# Patient Record
Sex: MALE | Race: BLACK OR AFRICAN AMERICAN | NOT HISPANIC OR LATINO | Employment: FULL TIME
[De-identification: names, ages, dates, MRNs, and addresses within clinical notes are randomized per-mention and may not be internally consistent; named-entity substitution may affect disease eponyms.]

---
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
## 2022-03-22 ENCOUNTER — APPOINTMENT (OUTPATIENT)
Dept: GENERAL RADIOLOGY | Facility: CLINIC | Age: 22
End: 2022-03-22
Attending: EMERGENCY MEDICINE
Payer: COMMERCIAL

## 2022-03-22 ENCOUNTER — HOSPITAL ENCOUNTER (EMERGENCY)
Facility: CLINIC | Age: 22
Discharge: HOME | End: 2022-03-23
Attending: EMERGENCY MEDICINE | Admitting: EMERGENCY MEDICINE
Payer: COMMERCIAL

## 2022-03-22 VITALS
RESPIRATION RATE: 16 BRPM | OXYGEN SATURATION: 100 % | TEMPERATURE: 97.6 F | HEART RATE: 100 BPM | DIASTOLIC BLOOD PRESSURE: 83 MMHG | SYSTOLIC BLOOD PRESSURE: 134 MMHG

## 2022-03-22 DIAGNOSIS — S93.491A SPRAIN OF ANTERIOR TALOFIBULAR LIGAMENT OF RIGHT ANKLE, INITIAL ENCOUNTER: ICD-10-CM

## 2022-03-22 PROCEDURE — 99284 EMERGENCY DEPT VISIT MOD MDM: CPT

## 2022-03-22 PROCEDURE — 73610 X-RAY EXAM OF ANKLE: CPT | Mod: RT

## 2022-03-22 ASSESSMENT — ENCOUNTER SYMPTOMS
JOINT SWELLING: 1
ARTHRALGIAS: 1

## 2022-03-22 NOTE — Clinical Note
Luigi Hutson was seen and treated in our emergency department on 3/22/2022.  He may return to work on 03/28/2022.       If you have any questions or concerns, please don't hesitate to call.      Josué Samaniego MD

## 2022-03-22 NOTE — Clinical Note
Haresh was seen and treated in our emergency department on 3/22/2022.  He may return to school on 03/28/2022.      If you have any questions or concerns, please don't hesitate to call.      Josué Samaniego MD

## 2022-03-23 NOTE — ED PROVIDER NOTES
History     Chief Complaint:  Ankle Pain     HPI   Luigi Hutson is a 21 year old male who presents with a sprained right ankle which was sustained while playing basketball. He inverted the ankle and has pain on the lateral aspect since.      Review of Systems   Musculoskeletal: Positive for arthralgias and joint swelling.   All other systems reviewed and are negative.    Allergies:  No known drug allergies     Medications:  Zyrtec    Past Medical History:     Autism     Social History:  Patient presents unaccomapnied     Physical Exam     Patient Vitals for the past 24 hrs:   BP Temp Temp src Pulse Resp SpO2   03/22/22 2256 134/83 97.6  F (36.4  C) Temporal 100 16 100 %     Physical Exam  Nursing note and vitals reviewed.  Constitutional: Cooperative.   Cardiovascular: Normal rate, regular rhythm. 2+ right DP pulse  Pulmonary/Chest: Effort normal .   Musculoskeletal: Normal range of motion of RLE. No tenderness to proximal tibia or 5th metatarsal. Swelling and tenderness to lateral malleolus.  Neurological: Alert. Strength and sensation in RLE normal.   Skin: Skin is warm and dry. No rash noted.   Psychiatric: Normal mood and affect.      Emergency Department Course   Imaging:  Ankle XR, G/E 3 views, right   Final Result   IMPRESSION:    1. No visualized acute fracture or malalignment of the right ankle.   2. Moderate soft tissue swelling over the lateral malleolus.         Report per radiology    Reviewed:  I reviewed nursing notes, vitals, past medical history and Care Everywhere    Assessments:  2334 I obtained history and examined the patient as noted above.     Disposition:  The patient was discharged to home.     Impression & Plan     Medical Decision Making:  Luigi Hutson is a 21 year old male who presents for evaluation after injuring the right ankle.  Signs and symptoms are consistent with an ankle sprain.  A broad differential was considered including sprain, strain, fracture,  tendon rupture, nerve impingement/compromise, referred pain. Supportive outpatient management is indicated.  Air case splint applied.  Rest, ice, and elevation treatment was discussed with the patient.     Diagnosis:    ICD-10-CM    1. Sprain of anterior talofibular ligament of right ankle, initial encounter  S93.491A        Scribe Disclosure:  I, Ross Welch, am serving as a scribe at 11:41 PM on 3/22/2022 to document services personally performed by Josué Samaniego MD based on my observations and the provider's statements to me.          Josué Samaniego MD  03/23/22 0140

## 2022-03-23 NOTE — PROGRESS NOTES
ASSESSMENT & PLAN    1. Sprain of anterior talofibular ligament of right ankle, initial encounter      Luigi Hutson is a 21 year old male presenting for evaluation of right inversion ankle sprain 1.5 weeks ago (DOI 3/22/22). History, exam and imaging findings were reviewed today, consistent with ATFL sprain. Swelling has improved and his pain is centered over ATFL with minimal laxity. Exam is reassuring against significant tear, multi-ligament injury or high ankle sprain. Reviewed plan inclusive of pain and swelling control (nsaids, ice, compression, elevation), modified activity (weight bearing as tolerated based on pain in the brace) and the importance of physical therapy to regain strength and balance. Has transitioned off crutches.    Upon discussion, plan to proceed with the following:  - Transition from air cast to tri-zoltan brace today.  - Weight bearing (walking) as tolerated in the brace based on pain.    - Referral placed to start physical therapy. When tolerated, your physical therapst will begin resistance exercises and proprioceptive drills, then help you advance back to playing sports.  - Encouraged to wear the ankle brace and the first 6 months after an ankle sprain, as this is the most vulnerable time for re-injury.      Please schedule a follow up appointment to see me in 4-6 weeks, or sooner as needed for persistence or worsening of pain. You may call our direct clinic number ([phone]) at any time with questions or concerns.    Patricia Mcwilliams MD, [facility]      -----    SUBJECTIVE  Luigi Hutson is a/an 21 year old male who is seen as an ER referral for evaluation of right ankle pain. The patient is seen by themselves.    Onset:3/22/22  9 day(s) ago. Patient describes injury as inverted ankle injury when landing awkwardly form a jump while playing basketball. He did not feel a pop or snap. He was able to hobble off the  jose with the assistance of a friend. He presented to the ED where X-rays were negative for fracture and exam was most consistent with ATFL sprain. He was placed in an aircast with crutches. He was able to transition off of the crutches over 1 week. Noting significant improvement in pain and swelling.   Location of Pain: right lateral ankle.   Rating of Pain at worst: 10/10  Rating of Pain Currently: 2/10  Worsened by: inversion   Better with: use of brace   Treatments tried: rest/activity avoidance, elevation, ice and casting/splinting/bracing  Associated symptoms: swelling. No ecchymosis, skin breakdown, numbness, tingling. No radiation of pain, instability or mechanical symptoms.    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, works at [employer]; enjoys recreational sports    Past Medical History:   Diagnosis Date     Eczema      Social History     Socioeconomic History     Marital status: Single     Spouse name: Not on file     Number of children: Not on file     Years of education: Not on file     Highest education level: Not on file   Occupational History     Not on file   Tobacco Use     Smoking status: Never Smoker     Smokeless tobacco: Not on file   Substance and Sexual Activity     Alcohol use: No     Drug use: No     Sexual activity: Never   Other Topics Concern     Not on file   Social History Narrative     Not on file     Social Determinants of Health     Financial Resource Strain: Not on file   Food Insecurity: Not on file   Transportation Needs: Not on file   Physical Activity: Not on file   Stress: Not on file   Social Connections: Not on file   Intimate Partner Violence: Not on file   Housing Stability: Not on file          Patient's past medical, surgical, social, and family histories were reviewed today and no changes are noted.    REVIEW OF SYSTEMS:  10 point ROS is negative other than symptoms noted above in HPI, Past  "Medical History or as stated below  Constitutional: NEGATIVE for fever, chills, change in weight  Skin: NEGATIVE for worrisome rashes, moles or lesions  GI/: NEGATIVE for bowel or bladder changes  Neuro: NEGATIVE for weakness, dizziness or paresthesias    OBJECTIVE:  /72   Ht 1.905 m (6' 3\")   Wt 104.3 kg (230 lb)   BMI 28.75 kg/m     General: healthy, alert and in no distress  HEENT: no scleral icterus or conjunctival erythema  Skin: no suspicious lesions or rash. No jaundice.  CV:  no pedal edema  Resp: normal respiratory effort without conversational dyspnea   Psych: normal mood and affect  Gait: normal steady gait with appropriate coordination and balance  Neuro: Normal light sensory exam of lower extremity  MSK:  RIGHT ANKLE  Inspection:    Moderate lateral ankle swelling. No bruising or ecchymosis is observed  Palpation:    Tender about the ATFL. Remainder of bony and ligamentous landmarks are nontender.  Range of Motion:     Plantarflexion limited slightly by pain / dorsiflexion limited slightly by pain / inversion within normal limits / eversion within normal limits  Strength:    Plantarflexion full / dorsiflexion full / inversion full / eversion full  Special Tests:    positive anterior drawer    negative squeeze test eversion stress test, talar tilt      Independent review of the below imaging:  RIGHT ANKLE 3 VIEWS  LOCATION: [facility]  DATE/TIME: 3/22/2022 11:06 PM     INDICATION: Injury. Swelling.  COMPARISON: None.                                                                      IMPRESSION:   1. No visualized acute fracture or malalignment of the right ankle.  2. Moderate soft tissue swelling over the lateral malleolus.      Patricia Mcwilliams MD, [facility]    "

## 2022-03-23 NOTE — ED TRIAGE NOTES
Pt here with c/o R ankle pain and swelling after landing on his ankle wrong while playing basketball this evening. CMS intact. Weight bearing as tolerated. ABC intact.

## 2022-04-01 ENCOUNTER — OFFICE VISIT (OUTPATIENT)
Dept: ORTHOPEDICS | Facility: CLINIC | Age: 22
End: 2022-04-01
Payer: COMMERCIAL

## 2022-04-01 VITALS
DIASTOLIC BLOOD PRESSURE: 72 MMHG | WEIGHT: 230 LBS | HEIGHT: 75 IN | SYSTOLIC BLOOD PRESSURE: 138 MMHG | BODY MASS INDEX: 28.6 KG/M2

## 2022-04-01 DIAGNOSIS — S93.491A SPRAIN OF ANTERIOR TALOFIBULAR LIGAMENT OF RIGHT ANKLE, INITIAL ENCOUNTER: Primary | ICD-10-CM

## 2022-04-01 PROCEDURE — 99204 OFFICE O/P NEW MOD 45 MIN: CPT | Performed by: STUDENT IN AN ORGANIZED HEALTH CARE EDUCATION/TRAINING PROGRAM

## 2022-04-01 NOTE — LETTER
4/1/2022         RE: Luigi Hutson  [address]  [place] [address]        Dear Colleague,    Thank you for referring your patient, Luigi Hutson, to the [facility] [place]. Please see a copy of my visit note below.    ASSESSMENT & PLAN    1. Sprain of anterior talofibular ligament of right ankle, initial encounter      Luigi Hutson is a 21 year old male presenting for evaluation of right inversion ankle sprain 1.5 weeks ago (DOI 3/22/22). History, exam and imaging findings were reviewed today, consistent with ATFL sprain. Swelling has improved and his pain is centered over ATFL with minimal laxity. Exam is reassuring against significant tear, multi-ligament injury or high ankle sprain. Reviewed plan inclusive of pain and swelling control (nsaids, ice, compression, elevation), modified activity (weight bearing as tolerated based on pain in the brace) and the importance of physical therapy to regain strength and balance. Has transitioned off crutches.    Upon discussion, plan to proceed with the following:  - Transition from air cast to tri-zoltan brace today.  - Weight bearing (walking) as tolerated in the brace based on pain.    - Referral placed to start physical therapy. When tolerated, your physical therapst will begin resistance exercises and proprioceptive drills, then help you advance back to playing sports.  - Encouraged to wear the ankle brace and the first 6 months after an ankle sprain, as this is the most vulnerable time for re-injury.      Please schedule a follow up appointment to see me in 4-6 weeks, or sooner as needed for persistence or worsening of pain. You may call our direct clinic number ([phone]) at any time with questions or concerns.    Patricia Mcwilliams MD, [facility]      -----    SUBJECTIVE  Luigi Hutson is a/an 21 year old male who is seen as an ER  referral for evaluation of right ankle pain. The patient is seen by themselves.    Onset:3/22/22  9 day(s) ago. Patient describes injury as inverted ankle injury when landing awkwardly form a jump while playing basketball. He did not feel a pop or snap. He was able to hobble off the court with the assistance of a friend. He presented to the ED where X-rays were negative for fracture and exam was most consistent with ATFL sprain. He was placed in an aircast with crutches. He was able to transition off of the crutches over 1 week. Noting significant improvement in pain and swelling.   Location of Pain: right lateral ankle.   Rating of Pain at worst: 10/10  Rating of Pain Currently: 2/10  Worsened by: inversion   Better with: use of brace   Treatments tried: rest/activity avoidance, elevation, ice and casting/splinting/bracing  Associated symptoms: swelling. No ecchymosis, skin breakdown, numbness, tingling. No radiation of pain, instability or mechanical symptoms.    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, works at [employer]; enjoys recreational sports    Past Medical History:   Diagnosis Date     Eczema      Social History     Socioeconomic History     Marital status: Single     Spouse name: Not on file     Number of children: Not on file     Years of education: Not on file     Highest education level: Not on file   Occupational History     Not on file   Tobacco Use     Smoking status: Never Smoker     Smokeless tobacco: Not on file   Substance and Sexual Activity     Alcohol use: No     Drug use: No     Sexual activity: Never   Other Topics Concern     Not on file   Social History Narrative     Not on file     Social Determinants of Health     Financial Resource Strain: Not on file   Food Insecurity: Not on file   Transportation Needs: Not on file   Physical Activity: Not on file   Stress: Not on file   Social Connections: Not on file  "  Intimate Partner Violence: Not on file   Housing Stability: Not on file          Patient's past medical, surgical, social, and family histories were reviewed today and no changes are noted.    REVIEW OF SYSTEMS:  10 point ROS is negative other than symptoms noted above in HPI, Past Medical History or as stated below  Constitutional: NEGATIVE for fever, chills, change in weight  Skin: NEGATIVE for worrisome rashes, moles or lesions  GI/: NEGATIVE for bowel or bladder changes  Neuro: NEGATIVE for weakness, dizziness or paresthesias    OBJECTIVE:  /72   Ht 1.905 m (6' 3\")   Wt 104.3 kg (230 lb)   BMI 28.75 kg/m     General: healthy, alert and in no distress  HEENT: no scleral icterus or conjunctival erythema  Skin: no suspicious lesions or rash. No jaundice.  CV:  no pedal edema  Resp: normal respiratory effort without conversational dyspnea   Psych: normal mood and affect  Gait: normal steady gait with appropriate coordination and balance  Neuro: Normal light sensory exam of lower extremity  MSK:  RIGHT ANKLE  Inspection:    Moderate lateral ankle swelling. No bruising or ecchymosis is observed  Palpation:    Tender about the ATFL. Remainder of bony and ligamentous landmarks are nontender.  Range of Motion:     Plantarflexion limited slightly by pain / dorsiflexion limited slightly by pain / inversion within normal limits / eversion within normal limits  Strength:    Plantarflexion full / dorsiflexion full / inversion full / eversion full  Special Tests:    positive anterior drawer    negative squeeze test eversion stress test, talar tilt      Independent review of the below imaging:  RIGHT ANKLE 3 VIEWS  LOCATION: [facility]  DATE/TIME: 3/22/2022 11:06 PM     INDICATION: Injury. Swelling.  COMPARISON: None.                                                                      IMPRESSION:   1. No visualized acute fracture or malalignment of the right ankle.  2. Moderate soft tissue " swelling over the lateral malleolus.      Patricia Mcwilliams MD, [facility]        Again, thank you for allowing me to participate in the care of your patient.        Sincerely,        Patricia Mcwilliams MD

## 2022-04-01 NOTE — PATIENT INSTRUCTIONS
1. Sprain of anterior talofibular ligament of right ankle, initial encounter      Luigi Hutson is a 21 year old male presenting for evaluation of right inversion ankle sprain 1.5 weeks ago (DOI 3/22/22). History, exam and imaging findings were reviewed today, consistent with ATFL sprain. Swelling has improved and his pain is centered over ATFL and AITFL. Reviewed plan inclusive of pain and swelling control (nsaids, ice, compression, elevation), modified activity (weight bearing as tolerated based on pain in the brace) and the importance of physical therapy to regain strength and balance. Has transitioned off crutches.    Upon discussion, plan to proceed with the following:  - Transition from air cast to tri-zoltan brace today.  - Weight bearing (walking) as tolerated in the brace based on pain.    - Referral placed to start physical therapy. When tolerated, your physical therapst will begin resistance exercises and proprioceptive drills, then help you advance back to playing sports.  - Encouraged to wear the ankle brace and the first 6 months after an ankle sprain, as this is the most vulnerable time for re-injury.      Please schedule a follow up appointment to see me in 4 weeks, or sooner as needed for persistence or worsening of pain. You may call our direct clinic number ([phone]) at any time with questions or concerns.    Patricia Mcwilliams MD, [facility]    WHAT IS AN ANKLE SPRAIN:  Symptoms include pain, bruising, and swelling around ankle, often on outer side. The pain may be sharp with activity and dull at rest. You may be walking with a limp at first. The swelling is often present after the pain resolves. If your pain is severe, not improving over 5-7 days, or shoots up your leg, let your physician know as you may need crutches and an immobilizer.    Treatment:  -Ice 10-15 minutes several times a day for the first few days and then after  activity.  -Walk as tolerated. Restrict other activities until pain allows. Biking, pool running or swimming are good alternative activities.  -You may benefit from an ankle brace for support while strengthening with therapy  -Some require immobilzation and crutches initially    Strengthening therapy  -Ice 10-15 minutes after activity. (or Ice bath 5-7min)  -often hip and ankle weakness leads to lower extremity (foot, ankle, shin, and knee) problems so a lot of focus will be on core strength and balance  - recommend yoga for core strengthening and stretching  -Perform exercises as instructed through handout or formal therapy if doing. Until then start with the following:  -ankle strengthening (additional below)   1) balance on one foot 1-2 min daily (perform on both feet)   2) arch raises- tighten bottom of foot like trying to shorten foot and hold x 3-5  sec, repeat 5 times   3) ankle exercises (4 way with theraband)- 3 sets of 10-15 (fatigue) daily   5) heel raises on step-- once painfree lowering on both, start to slowly lower on  one foot    Return to activity guidelines:  -If it hurts, do not do it!  -wear ankle brace until pain free with full activity and exercises for at least 6 weeks  -Must meet each goal before return to play:   1) painfree jogging straight line   2) pain free sprints   3) pain free cutting/ changing directions      Ankle Sprain Exercises    As soon as it doesn t hurt too much to put pressure on the ball of your foot, start stretching your ankle using the towel stretch. When this stretch is easy, try the other exercises.    Towel stretch: Sit on a hard surface with your injured leg stretched out in front of you. Loop a towel around your toes and the ball of your foot and pull the towel toward your body keeping your leg straight. Hold this position for 15 to 30 seconds and then relax. Repeat 3 times.    Standing calf stretch: Stand facing a wall with your hands on the wall at about eye  level. Keep your injured leg back with your heel on the floor. Keep the other leg forward with the knee bent. Turn your back foot slightly inward (as if you were pigeon-toed). Slowly lean into the wall until you feel a stretch in the back of your calf. Hold the stretch for 15 to 30 seconds. Return to the starting position. Repeat 3 times. Do this exercise several times each day.    Standing soleus stretch: Stand facing a wall with your hands on the wall at about chest height. Keep your injured leg back with your heel on the floor. Keep the other leg forward with the knee bent. Turn your back foot slightly inward (as if you were pigeon-toed). Bend your back knee slightly and gently lean into the wall until you feel a stretch in the lower calf of your injured leg. Hold the stretch for 15 to 30 seconds. Return to the starting position. Repeat 3 times.    Ankle range of motion: Sit or lie down with your legs straight and your knees pointing toward the ceiling. Point your toes on your injured side toward your nose, then away from your body. Point your toes in toward your other foot and then out away from your other foot. Finally, move the top of your foot in circles. Move only your foot and ankle. Don't move your leg. Repeat 10 times in each direction. Push hard in all directions.  Resisted ankle dorsiflexion: Tie a knot in one end of the elastic tubing and shut the knot in a door. Tie a loop in the other end of the tubing and put the foot on your injured side through the loop so that the tubing goes around the top of the foot. Sit facing the door with your injured leg straight out in front of you. Move away from the door until there is tension in the tubing. Keeping your leg straight, pull the top of your foot toward your body, stretching the tubing. Slowly return to the starting position. Do 2 sets of 15.  Resisted ankle plantar flexion: Sit with your injured leg stretched out in front of you. Loop the tubing around  the ball of your foot. Hold the ends of the tubing with both hands. Gently press the ball of your foot down and point your toes, stretching the tubing. Return to the starting position. Do 2 sets of 15.    Resisted ankle inversion: Sit with your legs stretched out in front of you. Cross the ankle of your uninjured leg over your other ankle. Wrap elastic tubing around the ball of the foot of your injured leg and then loop it around your other foot so that the tubing is anchored there at one end. Hold the other end of the tubing in your hand. Turn the foot of your injured leg inward and upward. This will stretch the tubing. Return to the starting position. Do 2 sets of 15.    Resisted ankle eversion: Sit with both legs stretched out in front of you, with your feet about a shoulder's width apart. Tie a loop in one end of elastic tubing. Put the foot of your injured leg through the loop so that the tubing goes around the arch of that foot and wraps around the outside of the other foot. Hold onto the other end of the tubing with your hand to provide tension. Turn the foot of your injured leg up and out. Make sure you keep your other foot still so that it will allow the tubing to stretch as you move the foot of your injured leg. Return to the starting position. Do 2 sets of 15.  You may do the following exercises when you can stand on your injured ankle without pain.    Heel raise: Stand behind a chair or counter with both feet flat on the floor. Using the chair or counter as a support, rise up onto your toes and hold for 5 seconds. Then slowly lower yourself down without holding onto the support. (It's OK to keep holding onto the support if you need to.) When this exercise becomes less painful, try doing this exercise while you are standing on the injured leg only. Repeat 15 times. Do 2 sets of 15. Rest 30 seconds between sets.    Step-up: Stand with the foot of your injured leg on a support 3 to 5 inches (8 to 13  centimeters) high --like a small step or block of wood. Keep your other foot flat on the floor. Shift your weight onto the injured leg on the support. Straighten your injured leg as the other leg comes off the floor. Return to the starting position by bending your injured leg and slowly lowering your uninjured leg back to the floor. Do 2 sets of 15.    Balance and reach exercises: Stand next to a chair with your injured leg farther from the chair. The chair will provide support if you need it. Stand on the foot of your injured leg and bend your knee slightly. Try to raise the arch of this foot while keeping your big toe on the floor. Keep your foot in this position.    With the hand that is farther away from the chair, reach forward in front of you by bending at the waist. Avoid bending your knee any more as you do this. Repeat this 15 times. To make the exercise more challenging, reach farther in front of you. Do 2 sets of 15.    While keeping your arch raised, reach the hand that is farther away from the chair across your body toward the chair. The farther you reach, the more challenging the exercise. Do 2 sets of 15.    Side-lying leg lift: Lie on your uninjured side. Tighten the front thigh muscles on your injured leg and lift that leg 8 to 10 inches (20 to 25 centimeters) away from the other leg. Keep the leg straight and lower it slowly. Do 2 sets of 15.  If you have access to a wobble board, do the following exercises:    Wobble board exercises  Stand on a wobble board with your feet shoulder-width apart.    Rock the board forwards and backwards 30 times, then side to side 30 times. Hold on to a chair if you need support.  Rotate the wobble board around so that the edge of the board is in contact with the floor at all times. Do this 30 times in a clockwise and then a counterclockwise direction.  Balance on the wobble board for as long as you can without letting the edges touch the floor. Try to do this for 2  minutes without touching the floor.  Rotate the wobble board in clockwise and counterclockwise circles, but do not let the edge of the board touch the floor.  When you have mastered the wobble exercises standing on both legs, try repeating them while standing on just your injured leg. After you are able to do these exercises on one leg, try to do them with your eyes closed. Make sure you have something nearby to support you in case you lose your balance.    Developed by [employer].  Published by [employer].  Copyright  2014 [employer] and/or one of its subsidiaries. All rights reserved.

## 2022-04-08 ENCOUNTER — THERAPY VISIT (OUTPATIENT)
Dept: PHYSICAL THERAPY | Facility: CLINIC | Age: 22
End: 2022-04-08
Payer: COMMERCIAL

## 2022-04-08 DIAGNOSIS — S93.491A SPRAIN OF ANTERIOR TALOFIBULAR LIGAMENT OF RIGHT ANKLE, INITIAL ENCOUNTER: ICD-10-CM

## 2022-04-08 DIAGNOSIS — S93.491A SPRAIN OF ANTERIOR TALOFIBULAR LIGAMENT OF RIGHT ANKLE: ICD-10-CM

## 2022-04-08 PROCEDURE — 97161 PT EVAL LOW COMPLEX 20 MIN: CPT | Mod: GP | Performed by: PHYSICAL THERAPIST

## 2022-04-08 PROCEDURE — 97110 THERAPEUTIC EXERCISES: CPT | Mod: GP | Performed by: PHYSICAL THERAPIST

## 2022-04-08 NOTE — PROGRESS NOTES
[facility]    OUTPATIENT Physical Therapy ORTHOPEDIC EVALUATION  PLAN OF TREATMENT FOR OUTPATIENT REHABILITATION  (COMPLETE FOR INITIAL CLAIMS ONLY)  Patient's Last Name, First Name, M.I.  YOB: 2000  Luigi Hutson    Provider s Name:  [facility]   Medical Record No.  [id number]   Start of Care Date:  04/08/22   Onset Date:   03/22/22   Type:     _X__PT   ___OT Medical Diagnosis:    Encounter Diagnoses   Name Primary?     Sprain of anterior talofibular ligament of right ankle, initial encounter      Sprain of anterior talofibular ligament of right ankle         Treatment Diagnosis:  right ankle sprain        Goals:     04/08/22 0500   Body Part   Goals listed below are for Right ankle sprain   Goal #1   Goal #1 ambulation   Previous Functional Level No restrictions   Current Functional Level Minutes patient can walk;Unable to make sharp turns;with brace;Ambulates with gait deviation of    Performance Level 10 min amb with brace with limp on right   STG Target Performance Minutes patient will be able to walk   Performance Level 20 minutes no limp on right level surface, stairs 1/10 pain   Rationale for safe household ambulation;for safe outdoor household ambulation;for safe community ambulation;for safe work place ambulation;to maintain proper body mechanics/posture while ambulating to avoid additional compensatory injury due to improper gait mechanics;to promote a healthy and active lifestyle   Due Date 05/06/22    LTG Target Performance Minutes patient will be able to  walk;on uneven terrain; on sharp inclines/declines;Able to make sharp turns;Ambulate without gait deviation   Performance Level 30 minutes, level and uneven surface, stairs, no limp right 0/10 pain   Rationale for safe household ambulation;for safe outdoor household ambulation;for safe  community ambulation;for safe work place ambulation;to maintain proper body mechanics/posture while ambulating to avoid additional compensatory injury due to improper gait mechanics;to promote a healthy and active lifestyle       Therapy Frequency:  1x/week  Predicted Duration of Therapy Intervention:  8 weeks    May Carrion, PT                 I CERTIFY THE NEED FOR THESE SERVICES FURNISHED UNDER        THIS PLAN OF TREATMENT AND WHILE UNDER MY CARE     (Physician attestation of this document indicates review and certification of the therapy plan).                       Certification Date From:  04/08/22   Certification Date To:  06/03/22    Referring Provider:  Patricia Mcwilliams    Initial Assessment        See Epic Evaluation SOC Date: 04/08/22

## 2022-04-08 NOTE — PROGRESS NOTES
Physical Therapy Initial Evaluation  Subjective:  The history is provided by the patient. No  was used.   Patient Health History  Luigi Hutson being seen for right ankle sprain.     Problem began: 3/22/2022.   Problem occurred: sprained right ankle landing awkwardly playing basketball   Pain is reported as 1/10 on pain scale.  General health as reported by patient is good.  Pertinent medical history includes: none.     Medical allergies: none.   Surgeries include:  None.    Current medications:  None.    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   Primary job tasks include:  Prolonged sitting and prolonged standing.                  Therapist Generated HPI Evaluation  Problem details: Pt reports spraining right ankle- 3/22/22 when he landed awkwardly playing basketball. He did go to the ED, had x-rays (-), and was given an aircast to wear.He used crutches for about one week. He saw ortho physician 4/1/22, diagnosis of right inversion, ATFL sprain. and transitioned from aircast brace to trilock brace which he has been wearing since. He is WBAT, and per MD note encouraged to wear brace x 6 months with activity to reduce chance of reinjury.   He normally goes to [facility] for weightlifting and exercise, but has not gone since the injury..         Type of problem:  Right ankle.    This is a new condition.  Condition occurred with:  A wrong landing.  Where condition occurred: during recreation/sport.  Patient reports pain:  Lateral and anterior.  Pain is described as aching and sharp and is constant.  Pain is the same all the time.  Since onset symptoms are gradually improving.  Associated symptoms:  Loss of motion/stiffness and loss of strength. Symptoms are exacerbated by bending/squatting, walking, certain positions and activity  and relieved by bracing/immobilizing, ice and rest.  Special tests included:  X-ray.    Restrictions due to  condition include:  Working in normal job without restrictions.  Barriers include:  None as reported by patient.                        Objective:    Gait:  Turns right foot out in eversion/ hip ER on right due to decreased dorsiflexion, with slight limp on right.   Gait Type:  Antalgic   Weight Bearing Status:  WBAT   Assistive Devices:  None            Ankle/Foot Evaluation  ROM:    AROM:    Dorsiflexion: Left:    Right:   0  Plantarflexion: Left:     Right:  25  Inversion: Left:      Right:  18  Eversion:     Right:  15      PROM:    Dorsiflexion: Left:        Right:   2             Pain: end range pain with inversion and eversion          PALPATION:     Right ankle tenderness present at:   anterior tibialis; anterior talofibular ligament and lateral malleolus  EDEMA: Edema ankle: moderate edema around lateral malleolus and anterior region. no pitting.            FUNCTIONAL TESTS:       Core Strength:     Single Leg Bridge: Right: did not test as unable due to pain/20 reps     Quad:    Single Leg Squat Right: did not test   Bilateral Leg Squat: 50% of ROM. shifts weight to left                                                           General     ROS    Assessment/Plan:    Patient is a 21 year old male with right side ankle complaints.    Patient has the following significant findings with corresponding treatment plan.                Diagnosis 1:  Right ankle sprain  Pain -  hot/cold therapy, manual therapy and home program  Decreased ROM/flexibility - manual therapy, therapeutic exercise and home program  Decreased joint mobility - manual therapy, therapeutic exercise and home program  Decreased strength - therapeutic exercise, therapeutic activities and home program  Impaired balance - neuro re-education, gait training, therapeutic activities and home program  Impaired gait - gait training and home program    Therapy Evaluation Codes:   1) History comprised of:   Personal factors that impact the plan of care:       None.    Comorbidity factors that impact the plan of care are:      None.     Medications impacting care: None.  2) Examination of Body Systems comprised of:   Body structures and functions that impact the plan of care:      Ankle.   Activity limitations that impact the plan of care are:      Bending, Jumping, Running, Sports, Squatting/kneeling, Stairs, Standing and Walking.  3) Clinical presentation characteristics are:   Stable/Uncomplicated.  4) Decision-Making    Low complexity using standardized patient assessment instrument and/or measureable assessment of functional outcome.  Cumulative Therapy Evaluation is: Low complexity.    Previous and current functional limitations:  (See Goal Flow Sheet for this information)    Short term and Long term goals: (See Goal Flow Sheet for this information)     Communication ability:  Patient appears to be able to clearly communicate and understand verbal and written communication and follow directions correctly.  Treatment Explanation - The following has been discussed with the patient:   RX ordered/plan of care  Anticipated outcomes  Possible risks and side effects  This patient would benefit from PT intervention to resume normal activities.   Rehab potential is good.    Frequency:  1 X week, once daily  Duration:  for 4weeks tapering to 2X a month over 4 weeks  Discharge Plan:  Achieve all LTG.  Independent in home treatment program.  Reach maximal therapeutic benefit.    Please refer to the daily flowsheet for treatment today, total treatment time and time spent performing 1:1 timed codes.

## 2022-04-22 ENCOUNTER — THERAPY VISIT (OUTPATIENT)
Dept: PHYSICAL THERAPY | Facility: CLINIC | Age: 22
End: 2022-04-22
Payer: COMMERCIAL

## 2022-04-22 DIAGNOSIS — S93.491A SPRAIN OF ANTERIOR TALOFIBULAR LIGAMENT OF RIGHT ANKLE: Primary | ICD-10-CM

## 2022-04-22 PROCEDURE — 97110 THERAPEUTIC EXERCISES: CPT | Mod: GP | Performed by: PHYSICAL THERAPIST

## 2022-04-22 PROCEDURE — 97112 NEUROMUSCULAR REEDUCATION: CPT | Mod: GP | Performed by: PHYSICAL THERAPIST

## 2022-05-27 PROBLEM — S93.491A SPRAIN OF ANTERIOR TALOFIBULAR LIGAMENT OF RIGHT ANKLE: Status: RESOLVED | Noted: 2022-04-08 | Resolved: 2022-05-27

## 2022-05-27 NOTE — PROGRESS NOTES
Discharge Note    Luigi has not scheduled further follow up and current status is unknown.  Please see information below for last relevant information on current status.  Patient seen for 2 visits.    SUBJECTIVE  Subjective changes noted by patient:  Pt 15 minutes late. overall is better, not wearing brace as much for everyday activites, will still wear with prolonged walking. can walk for 20 minutes minimal to no pain.  .  Current pain level is 0/10.     Previous pain level was  1/10.   Changes in function:  Yes (See Goal flowsheet attached for changes in current functional level)  Adverse reaction to treatment or activity: None    OBJECTIVE  Changes noted in objective findings: right AROM DF 6; ambulate with good gait pattern, no limp on right.     ASSESSMENT/PLAN  Diagnosis: right ankle sprain   Updated problem list and treatment plan:     STG/LTGs have been met or progress has been made towards goals:  Yes, please see goal flowsheet for most current information  Assessment of Progress: current status is unknown.    Last current status: Pt is progressing well   Self Management Plans:  HEP  I have re-evaluated this patient and find that the nature, scope, duration and intensity of the therapy is appropriate for the medical condition of the patient.  Luigi continues to require the following intervention to meet STG and LTG's:  HEP.    Recommendations:  Discharge with current home program.  Patient to follow up with MD as needed.    Please refer to the daily flowsheet for treatment today, total treatment time and time spent performing 1:1 timed codes.